# Patient Record
Sex: MALE | Race: WHITE | HISPANIC OR LATINO | Employment: FULL TIME | URBAN - METROPOLITAN AREA
[De-identification: names, ages, dates, MRNs, and addresses within clinical notes are randomized per-mention and may not be internally consistent; named-entity substitution may affect disease eponyms.]

---

## 2020-03-06 ENCOUNTER — HOSPITAL ENCOUNTER (INPATIENT)
Facility: MEDICAL CENTER | Age: 55
LOS: 3 days | DRG: 291 | End: 2020-03-09
Attending: EMERGENCY MEDICINE | Admitting: INTERNAL MEDICINE

## 2020-03-06 ENCOUNTER — APPOINTMENT (OUTPATIENT)
Dept: RADIOLOGY | Facility: MEDICAL CENTER | Age: 55
DRG: 291 | End: 2020-03-06
Attending: EMERGENCY MEDICINE

## 2020-03-06 DIAGNOSIS — J96.01 ACUTE RESPIRATORY FAILURE WITH HYPOXIA (HCC): ICD-10-CM

## 2020-03-06 DIAGNOSIS — I16.1 HYPERTENSIVE EMERGENCY: ICD-10-CM

## 2020-03-06 DIAGNOSIS — J81.0 ACUTE PULMONARY EDEMA (HCC): ICD-10-CM

## 2020-03-06 LAB
ALBUMIN SERPL BCP-MCNC: 4.6 G/DL (ref 3.2–4.9)
ALBUMIN/GLOB SERPL: 1.4 G/DL
ALP SERPL-CCNC: 77 U/L (ref 30–99)
ALT SERPL-CCNC: 48 U/L (ref 2–50)
ANION GAP SERPL CALC-SCNC: 12 MMOL/L (ref 7–16)
AST SERPL-CCNC: 65 U/L (ref 12–45)
BASOPHILS # BLD AUTO: 1.2 % (ref 0–1.8)
BASOPHILS # BLD: 0.15 K/UL (ref 0–0.12)
BILIRUB SERPL-MCNC: 0.6 MG/DL (ref 0.1–1.5)
BUN SERPL-MCNC: 14 MG/DL (ref 8–22)
CALCIUM SERPL-MCNC: 8.9 MG/DL (ref 8.4–10.2)
CHLORIDE SERPL-SCNC: 97 MMOL/L (ref 96–112)
CO2 SERPL-SCNC: 24 MMOL/L (ref 20–33)
CREAT SERPL-MCNC: 0.85 MG/DL (ref 0.5–1.4)
EOSINOPHIL # BLD AUTO: 0.25 K/UL (ref 0–0.51)
EOSINOPHIL NFR BLD: 2 % (ref 0–6.9)
ERYTHROCYTE [DISTWIDTH] IN BLOOD BY AUTOMATED COUNT: 40.1 FL (ref 35.9–50)
FLUAV RNA SPEC QL NAA+PROBE: NEGATIVE
FLUBV RNA SPEC QL NAA+PROBE: NEGATIVE
GLOBULIN SER CALC-MCNC: 3.4 G/DL (ref 1.9–3.5)
GLUCOSE SERPL-MCNC: 174 MG/DL (ref 65–99)
HCT VFR BLD AUTO: 53.5 % (ref 42–52)
HGB BLD-MCNC: 18.7 G/DL (ref 14–18)
IMM GRANULOCYTES # BLD AUTO: 0.05 K/UL (ref 0–0.11)
IMM GRANULOCYTES NFR BLD AUTO: 0.4 % (ref 0–0.9)
LACTATE BLD-SCNC: 2 MMOL/L (ref 0.5–2)
LYMPHOCYTES # BLD AUTO: 2.92 K/UL (ref 1–4.8)
LYMPHOCYTES NFR BLD: 23.9 % (ref 22–41)
MCH RBC QN AUTO: 31.6 PG (ref 27–33)
MCHC RBC AUTO-ENTMCNC: 35 G/DL (ref 33.7–35.3)
MCV RBC AUTO: 90.5 FL (ref 81.4–97.8)
MONOCYTES # BLD AUTO: 0.56 K/UL (ref 0–0.85)
MONOCYTES NFR BLD AUTO: 4.6 % (ref 0–13.4)
NEUTROPHILS # BLD AUTO: 8.28 K/UL (ref 1.82–7.42)
NEUTROPHILS NFR BLD: 67.9 % (ref 44–72)
NRBC # BLD AUTO: 0 K/UL
NRBC BLD-RTO: 0 /100 WBC
NT-PROBNP SERPL IA-MCNC: 1718 PG/ML (ref 0–125)
PLATELET # BLD AUTO: 222 K/UL (ref 164–446)
PMV BLD AUTO: 9.7 FL (ref 9–12.9)
POTASSIUM SERPL-SCNC: 3.3 MMOL/L (ref 3.6–5.5)
PROT SERPL-MCNC: 8 G/DL (ref 6–8.2)
RBC # BLD AUTO: 5.91 M/UL (ref 4.7–6.1)
S PYO AG THROAT QL: NORMAL
SIGNIFICANT IND 70042: NORMAL
SITE SITE: NORMAL
SODIUM SERPL-SCNC: 133 MMOL/L (ref 135–145)
SOURCE SOURCE: NORMAL
TROPONIN T SERPL-MCNC: 12 NG/L (ref 6–19)
WBC # BLD AUTO: 12.2 K/UL (ref 4.8–10.8)

## 2020-03-06 PROCEDURE — 5A09357 ASSISTANCE WITH RESPIRATORY VENTILATION, LESS THAN 24 CONSECUTIVE HOURS, CONTINUOUS POSITIVE AIRWAY PRESSURE: ICD-10-PCS | Performed by: EMERGENCY MEDICINE

## 2020-03-06 PROCEDURE — 87880 STREP A ASSAY W/OPTIC: CPT

## 2020-03-06 PROCEDURE — 87486 CHLMYD PNEUM DNA AMP PROBE: CPT

## 2020-03-06 PROCEDURE — 80053 COMPREHEN METABOLIC PANEL: CPT

## 2020-03-06 PROCEDURE — 84484 ASSAY OF TROPONIN QUANT: CPT

## 2020-03-06 PROCEDURE — 93005 ELECTROCARDIOGRAM TRACING: CPT | Performed by: EMERGENCY MEDICINE

## 2020-03-06 PROCEDURE — 87581 M.PNEUMON DNA AMP PROBE: CPT

## 2020-03-06 PROCEDURE — 700111 HCHG RX REV CODE 636 W/ 250 OVERRIDE (IP): Performed by: EMERGENCY MEDICINE

## 2020-03-06 PROCEDURE — 96375 TX/PRO/DX INJ NEW DRUG ADDON: CPT

## 2020-03-06 PROCEDURE — 83880 ASSAY OF NATRIURETIC PEPTIDE: CPT

## 2020-03-06 PROCEDURE — 87651 STREP A DNA AMP PROBE: CPT

## 2020-03-06 PROCEDURE — 96365 THER/PROPH/DIAG IV INF INIT: CPT

## 2020-03-06 PROCEDURE — 71045 X-RAY EXAM CHEST 1 VIEW: CPT

## 2020-03-06 PROCEDURE — 700111 HCHG RX REV CODE 636 W/ 250 OVERRIDE (IP)

## 2020-03-06 PROCEDURE — 87633 RESP VIRUS 12-25 TARGETS: CPT

## 2020-03-06 PROCEDURE — 96366 THER/PROPH/DIAG IV INF ADDON: CPT

## 2020-03-06 PROCEDURE — 87502 INFLUENZA DNA AMP PROBE: CPT

## 2020-03-06 PROCEDURE — 770022 HCHG ROOM/CARE - ICU (200)

## 2020-03-06 PROCEDURE — 94660 CPAP INITIATION&MGMT: CPT

## 2020-03-06 PROCEDURE — 85025 COMPLETE CBC W/AUTO DIFF WBC: CPT

## 2020-03-06 PROCEDURE — 99291 CRITICAL CARE FIRST HOUR: CPT | Performed by: INTERNAL MEDICINE

## 2020-03-06 PROCEDURE — 83605 ASSAY OF LACTIC ACID: CPT

## 2020-03-06 RX ORDER — NITROGLYCERIN 20 MG/100ML
200 INJECTION INTRAVENOUS ONCE
Status: COMPLETED | OUTPATIENT
Start: 2020-03-06 | End: 2020-03-06

## 2020-03-06 RX ORDER — FUROSEMIDE 10 MG/ML
80 INJECTION INTRAMUSCULAR; INTRAVENOUS ONCE
Status: COMPLETED | OUTPATIENT
Start: 2020-03-06 | End: 2020-03-06

## 2020-03-06 RX ORDER — IPRATROPIUM BROMIDE AND ALBUTEROL SULFATE 2.5; .5 MG/3ML; MG/3ML
6 SOLUTION RESPIRATORY (INHALATION) ONCE
Status: ACTIVE | OUTPATIENT
Start: 2020-03-06 | End: 2020-03-07

## 2020-03-06 RX ORDER — LORAZEPAM 2 MG/ML
1 INJECTION INTRAMUSCULAR ONCE
Status: COMPLETED | OUTPATIENT
Start: 2020-03-06 | End: 2020-03-06

## 2020-03-06 RX ORDER — NITROGLYCERIN 20 MG/100ML
0-200 INJECTION INTRAVENOUS ONCE
Status: COMPLETED | OUTPATIENT
Start: 2020-03-06 | End: 2020-03-07

## 2020-03-06 RX ORDER — NITROGLYCERIN 20 MG/100ML
INJECTION INTRAVENOUS
Status: COMPLETED
Start: 2020-03-06 | End: 2020-03-06

## 2020-03-06 RX ADMIN — NITROGLYCERIN 100 MCG/MIN: 20 INJECTION INTRAVENOUS at 22:53

## 2020-03-06 RX ADMIN — FUROSEMIDE 80 MG: 10 INJECTION, SOLUTION INTRAVENOUS at 23:13

## 2020-03-06 RX ADMIN — NITROGLYCERIN 400 MCG/MIN: 20 INJECTION INTRAVENOUS at 23:08

## 2020-03-06 RX ADMIN — NITROGLYCERIN 400 MCG/MIN: 20 INJECTION INTRAVENOUS at 23:12

## 2020-03-06 RX ADMIN — LORAZEPAM 1 MG: 2 INJECTION INTRAMUSCULAR; INTRAVENOUS at 23:27

## 2020-03-06 ASSESSMENT — ENCOUNTER SYMPTOMS
COUGH: 0
CHILLS: 0
SHORTNESS OF BREATH: 0
FOCAL WEAKNESS: 0
BACK PAIN: 0
NECK PAIN: 0
ABDOMINAL PAIN: 0
VOMITING: 0
FEVER: 0
EYE REDNESS: 0
BLURRED VISION: 0
NERVOUS/ANXIOUS: 1
HEADACHES: 0
SEIZURES: 0
SORE THROAT: 0

## 2020-03-06 ASSESSMENT — PULMONARY FUNCTION TESTS: EPAP_CMH2O: 5

## 2020-03-07 ENCOUNTER — APPOINTMENT (OUTPATIENT)
Dept: CARDIOLOGY | Facility: MEDICAL CENTER | Age: 55
DRG: 291 | End: 2020-03-07
Attending: INTERNAL MEDICINE

## 2020-03-07 ENCOUNTER — APPOINTMENT (OUTPATIENT)
Dept: RADIOLOGY | Facility: MEDICAL CENTER | Age: 55
DRG: 291 | End: 2020-03-07
Attending: INTERNAL MEDICINE

## 2020-03-07 PROBLEM — J81.0 FLASH PULMONARY EDEMA (HCC): Status: ACTIVE | Noted: 2020-03-07

## 2020-03-07 PROBLEM — J96.01 ACUTE RESPIRATORY FAILURE WITH HYPOXIA (HCC): Status: ACTIVE | Noted: 2020-03-07

## 2020-03-07 PROBLEM — I16.1 HYPERTENSIVE EMERGENCY: Status: ACTIVE | Noted: 2020-03-07

## 2020-03-07 PROBLEM — D75.1 POLYCYTHEMIA: Status: ACTIVE | Noted: 2020-03-07

## 2020-03-07 PROBLEM — E87.6 HYPOKALEMIA: Status: ACTIVE | Noted: 2020-03-07

## 2020-03-07 PROBLEM — R73.9 HYPERGLYCEMIA: Status: ACTIVE | Noted: 2020-03-07

## 2020-03-07 PROBLEM — D72.829 LEUKOCYTOSIS: Status: ACTIVE | Noted: 2020-03-07

## 2020-03-07 LAB
ANION GAP SERPL CALC-SCNC: 11 MMOL/L (ref 7–16)
BASOPHILS # BLD AUTO: 0.4 % (ref 0–1.8)
BASOPHILS # BLD: 0.07 K/UL (ref 0–0.12)
BUN SERPL-MCNC: 14 MG/DL (ref 8–22)
C PNEUM DNA SPEC QL NAA+PROBE: NOT DETECTED
CALCIUM SERPL-MCNC: 8.5 MG/DL (ref 8.4–10.2)
CHLORIDE SERPL-SCNC: 97 MMOL/L (ref 96–112)
CO2 SERPL-SCNC: 26 MMOL/L (ref 20–33)
CREAT SERPL-MCNC: 0.9 MG/DL (ref 0.5–1.4)
EKG IMPRESSION: NORMAL
EOSINOPHIL # BLD AUTO: 0.01 K/UL (ref 0–0.51)
EOSINOPHIL NFR BLD: 0.1 % (ref 0–6.9)
ERYTHROCYTE [DISTWIDTH] IN BLOOD BY AUTOMATED COUNT: 39.9 FL (ref 35.9–50)
EST. AVERAGE GLUCOSE BLD GHB EST-MCNC: 111 MG/DL
FLUAV H1 2009 PAND RNA SPEC QL NAA+PROBE: NOT DETECTED
FLUAV H1 RNA SPEC QL NAA+PROBE: NOT DETECTED
FLUAV H3 RNA SPEC QL NAA+PROBE: NOT DETECTED
FLUAV RNA SPEC QL NAA+PROBE: NOT DETECTED
FLUBV RNA SPEC QL NAA+PROBE: NOT DETECTED
GLUCOSE BLD-MCNC: 120 MG/DL (ref 65–99)
GLUCOSE BLD-MCNC: 139 MG/DL (ref 65–99)
GLUCOSE BLD-MCNC: 84 MG/DL (ref 65–99)
GLUCOSE BLD-MCNC: 97 MG/DL (ref 65–99)
GLUCOSE BLD-MCNC: 99 MG/DL (ref 65–99)
GLUCOSE SERPL-MCNC: 140 MG/DL (ref 65–99)
HADV DNA SPEC QL NAA+PROBE: NOT DETECTED
HBA1C MFR BLD: 5.5 % (ref 0–5.6)
HCOV RNA SPEC QL NAA+PROBE: NOT DETECTED
HCT VFR BLD AUTO: 47.3 % (ref 42–52)
HGB BLD-MCNC: 16.6 G/DL (ref 14–18)
HMPV RNA SPEC QL NAA+PROBE: NOT DETECTED
HPIV1 RNA SPEC QL NAA+PROBE: NOT DETECTED
HPIV2 RNA SPEC QL NAA+PROBE: NOT DETECTED
HPIV3 RNA SPEC QL NAA+PROBE: NOT DETECTED
HPIV4 RNA SPEC QL NAA+PROBE: NOT DETECTED
IMM GRANULOCYTES # BLD AUTO: 0.08 K/UL (ref 0–0.11)
IMM GRANULOCYTES NFR BLD AUTO: 0.5 % (ref 0–0.9)
LV EJECT FRACT  99904: 40
LV EJECT FRACT MOD 2C 99903: 54.25
LV EJECT FRACT MOD 4C 99902: 48.93
LV EJECT FRACT MOD BP 99901: 49.44
LYMPHOCYTES # BLD AUTO: 0.87 K/UL (ref 1–4.8)
LYMPHOCYTES NFR BLD: 5.3 % (ref 22–41)
M PNEUMO DNA SPEC QL NAA+PROBE: NOT DETECTED
MCH RBC QN AUTO: 31.3 PG (ref 27–33)
MCHC RBC AUTO-ENTMCNC: 35.1 G/DL (ref 33.7–35.3)
MCV RBC AUTO: 89.2 FL (ref 81.4–97.8)
MONOCYTES # BLD AUTO: 0.89 K/UL (ref 0–0.85)
MONOCYTES NFR BLD AUTO: 5.5 % (ref 0–13.4)
NEUTROPHILS # BLD AUTO: 14.38 K/UL (ref 1.82–7.42)
NEUTROPHILS NFR BLD: 88.2 % (ref 44–72)
NRBC # BLD AUTO: 0 K/UL
NRBC BLD-RTO: 0 /100 WBC
PLATELET # BLD AUTO: 203 K/UL (ref 164–446)
PMV BLD AUTO: 9.9 FL (ref 9–12.9)
POTASSIUM SERPL-SCNC: 3.7 MMOL/L (ref 3.6–5.5)
RBC # BLD AUTO: 5.3 M/UL (ref 4.7–6.1)
RSV A RNA SPEC QL NAA+PROBE: NOT DETECTED
RSV B RNA SPEC QL NAA+PROBE: NOT DETECTED
RV+EV RNA SPEC QL NAA+PROBE: NOT DETECTED
S PYO DNA SPEC NAA+PROBE: NOT DETECTED
SODIUM SERPL-SCNC: 134 MMOL/L (ref 135–145)
TROPONIN T SERPL-MCNC: 53 NG/L (ref 6–19)
TROPONIN T SERPL-MCNC: 62 NG/L (ref 6–19)
WBC # BLD AUTO: 16.3 K/UL (ref 4.8–10.8)

## 2020-03-07 PROCEDURE — 83036 HEMOGLOBIN GLYCOSYLATED A1C: CPT

## 2020-03-07 PROCEDURE — 99291 CRITICAL CARE FIRST HOUR: CPT

## 2020-03-07 PROCEDURE — 84484 ASSAY OF TROPONIN QUANT: CPT

## 2020-03-07 PROCEDURE — 93005 ELECTROCARDIOGRAM TRACING: CPT | Performed by: EMERGENCY MEDICINE

## 2020-03-07 PROCEDURE — 93306 TTE W/DOPPLER COMPLETE: CPT | Mod: 26 | Performed by: INTERNAL MEDICINE

## 2020-03-07 PROCEDURE — 700111 HCHG RX REV CODE 636 W/ 250 OVERRIDE (IP): Performed by: INTERNAL MEDICINE

## 2020-03-07 PROCEDURE — 96366 THER/PROPH/DIAG IV INF ADDON: CPT

## 2020-03-07 PROCEDURE — 94760 N-INVAS EAR/PLS OXIMETRY 1: CPT

## 2020-03-07 PROCEDURE — 99233 SBSQ HOSP IP/OBS HIGH 50: CPT | Performed by: INTERNAL MEDICINE

## 2020-03-07 PROCEDURE — 87040 BLOOD CULTURE FOR BACTERIA: CPT

## 2020-03-07 PROCEDURE — 93306 TTE W/DOPPLER COMPLETE: CPT

## 2020-03-07 PROCEDURE — A9270 NON-COVERED ITEM OR SERVICE: HCPCS | Performed by: INTERNAL MEDICINE

## 2020-03-07 PROCEDURE — 82962 GLUCOSE BLOOD TEST: CPT | Mod: 91

## 2020-03-07 PROCEDURE — 71045 X-RAY EXAM CHEST 1 VIEW: CPT

## 2020-03-07 PROCEDURE — 700102 HCHG RX REV CODE 250 W/ 637 OVERRIDE(OP): Performed by: INTERNAL MEDICINE

## 2020-03-07 PROCEDURE — 85025 COMPLETE CBC W/AUTO DIFF WBC: CPT

## 2020-03-07 PROCEDURE — 700117 HCHG RX CONTRAST REV CODE 255: Performed by: INTERNAL MEDICINE

## 2020-03-07 PROCEDURE — 94660 CPAP INITIATION&MGMT: CPT

## 2020-03-07 PROCEDURE — 770020 HCHG ROOM/CARE - TELE (206)

## 2020-03-07 PROCEDURE — 80048 BASIC METABOLIC PNL TOTAL CA: CPT

## 2020-03-07 PROCEDURE — 96375 TX/PRO/DX INJ NEW DRUG ADDON: CPT

## 2020-03-07 RX ORDER — POTASSIUM CHLORIDE 20 MEQ/1
40 TABLET, EXTENDED RELEASE ORAL 2 TIMES DAILY
Status: DISCONTINUED | OUTPATIENT
Start: 2020-03-07 | End: 2020-03-09 | Stop reason: HOSPADM

## 2020-03-07 RX ORDER — LABETALOL HYDROCHLORIDE 5 MG/ML
10 INJECTION, SOLUTION INTRAVENOUS EVERY 4 HOURS PRN
Status: DISCONTINUED | OUTPATIENT
Start: 2020-03-07 | End: 2020-03-09 | Stop reason: HOSPADM

## 2020-03-07 RX ORDER — PROCHLORPERAZINE EDISYLATE 5 MG/ML
5-10 INJECTION INTRAMUSCULAR; INTRAVENOUS EVERY 4 HOURS PRN
Status: DISCONTINUED | OUTPATIENT
Start: 2020-03-07 | End: 2020-03-09 | Stop reason: HOSPADM

## 2020-03-07 RX ORDER — POLYETHYLENE GLYCOL 3350 17 G/17G
1 POWDER, FOR SOLUTION ORAL
Status: DISCONTINUED | OUTPATIENT
Start: 2020-03-07 | End: 2020-03-09 | Stop reason: HOSPADM

## 2020-03-07 RX ORDER — ENALAPRILAT 1.25 MG/ML
1.25 INJECTION INTRAVENOUS EVERY 6 HOURS PRN
Status: DISCONTINUED | OUTPATIENT
Start: 2020-03-07 | End: 2020-03-09 | Stop reason: HOSPADM

## 2020-03-07 RX ORDER — ACETAMINOPHEN 325 MG/1
650 TABLET ORAL EVERY 6 HOURS PRN
Status: DISCONTINUED | OUTPATIENT
Start: 2020-03-07 | End: 2020-03-09 | Stop reason: HOSPADM

## 2020-03-07 RX ORDER — FUROSEMIDE 10 MG/ML
40 INJECTION INTRAMUSCULAR; INTRAVENOUS
Status: DISCONTINUED | OUTPATIENT
Start: 2020-03-07 | End: 2020-03-07

## 2020-03-07 RX ORDER — PROMETHAZINE HYDROCHLORIDE 25 MG/1
12.5-25 TABLET ORAL EVERY 4 HOURS PRN
Status: DISCONTINUED | OUTPATIENT
Start: 2020-03-07 | End: 2020-03-09 | Stop reason: HOSPADM

## 2020-03-07 RX ORDER — PROMETHAZINE HYDROCHLORIDE 25 MG/1
12.5-25 SUPPOSITORY RECTAL EVERY 4 HOURS PRN
Status: DISCONTINUED | OUTPATIENT
Start: 2020-03-07 | End: 2020-03-09 | Stop reason: HOSPADM

## 2020-03-07 RX ORDER — LORAZEPAM 2 MG/ML
1 INJECTION INTRAMUSCULAR
Status: DISCONTINUED | OUTPATIENT
Start: 2020-03-07 | End: 2020-03-09 | Stop reason: HOSPADM

## 2020-03-07 RX ORDER — BISACODYL 10 MG
10 SUPPOSITORY, RECTAL RECTAL
Status: DISCONTINUED | OUTPATIENT
Start: 2020-03-07 | End: 2020-03-09 | Stop reason: HOSPADM

## 2020-03-07 RX ORDER — LISINOPRIL 5 MG/1
10 TABLET ORAL
Status: DISCONTINUED | OUTPATIENT
Start: 2020-03-07 | End: 2020-03-08

## 2020-03-07 RX ORDER — AMOXICILLIN 250 MG
2 CAPSULE ORAL 2 TIMES DAILY
Status: DISCONTINUED | OUTPATIENT
Start: 2020-03-07 | End: 2020-03-09 | Stop reason: HOSPADM

## 2020-03-07 RX ORDER — ONDANSETRON 4 MG/1
4 TABLET, ORALLY DISINTEGRATING ORAL EVERY 4 HOURS PRN
Status: DISCONTINUED | OUTPATIENT
Start: 2020-03-07 | End: 2020-03-09 | Stop reason: HOSPADM

## 2020-03-07 RX ORDER — ONDANSETRON 2 MG/ML
4 INJECTION INTRAMUSCULAR; INTRAVENOUS EVERY 4 HOURS PRN
Status: DISCONTINUED | OUTPATIENT
Start: 2020-03-07 | End: 2020-03-09 | Stop reason: HOSPADM

## 2020-03-07 RX ORDER — FUROSEMIDE 10 MG/ML
20 INJECTION INTRAMUSCULAR; INTRAVENOUS
Status: DISCONTINUED | OUTPATIENT
Start: 2020-03-08 | End: 2020-03-08

## 2020-03-07 RX ORDER — NITROGLYCERIN 20 MG/100ML
0-200 INJECTION INTRAVENOUS CONTINUOUS
Status: DISCONTINUED | OUTPATIENT
Start: 2020-03-07 | End: 2020-03-07

## 2020-03-07 RX ADMIN — ONDANSETRON 4 MG: 2 INJECTION INTRAMUSCULAR; INTRAVENOUS at 03:20

## 2020-03-07 RX ADMIN — POTASSIUM CHLORIDE 40 MEQ: 1500 TABLET, EXTENDED RELEASE ORAL at 17:13

## 2020-03-07 RX ADMIN — ENALAPRILAT 1.25 MG: 2.5 INJECTION INTRAVENOUS at 23:26

## 2020-03-07 RX ADMIN — POTASSIUM CHLORIDE 40 MEQ: 1500 TABLET, EXTENDED RELEASE ORAL at 05:10

## 2020-03-07 RX ADMIN — FUROSEMIDE 40 MG: 10 INJECTION, SOLUTION INTRAMUSCULAR; INTRAVENOUS at 06:02

## 2020-03-07 RX ADMIN — LISINOPRIL 10 MG: 5 TABLET ORAL at 05:10

## 2020-03-07 RX ADMIN — HUMAN ALBUMIN MICROSPHERES AND PERFLUTREN 3 ML: 10; .22 INJECTION, SOLUTION INTRAVENOUS at 14:57

## 2020-03-07 RX ADMIN — ENOXAPARIN SODIUM 40 MG: 40 INJECTION SUBCUTANEOUS at 05:15

## 2020-03-07 ASSESSMENT — ENCOUNTER SYMPTOMS
NEUROLOGICAL NEGATIVE: 1
GASTROINTESTINAL NEGATIVE: 1
EYES NEGATIVE: 1
PSYCHIATRIC NEGATIVE: 1
RESPIRATORY NEGATIVE: 1
CARDIOVASCULAR NEGATIVE: 1
MUSCULOSKELETAL NEGATIVE: 1

## 2020-03-07 ASSESSMENT — COGNITIVE AND FUNCTIONAL STATUS - GENERAL
SUGGESTED CMS G CODE MODIFIER MOBILITY: CH
MOBILITY SCORE: 24
MOBILITY SCORE: 24
SUGGESTED CMS G CODE MODIFIER DAILY ACTIVITY: CH
SUGGESTED CMS G CODE MODIFIER MOBILITY: CH
SUGGESTED CMS G CODE MODIFIER DAILY ACTIVITY: CH
DAILY ACTIVITIY SCORE: 24
DAILY ACTIVITIY SCORE: 24

## 2020-03-07 ASSESSMENT — LIFESTYLE VARIABLES
HAVE PEOPLE ANNOYED YOU BY CRITICIZING YOUR DRINKING: NO
AVERAGE NUMBER OF DAYS PER WEEK YOU HAVE A DRINK CONTAINING ALCOHOL: 0
EVER_SMOKED: YES
EVER HAD A DRINK FIRST THING IN THE MORNING TO STEADY YOUR NERVES TO GET RID OF A HANGOVER: NO
ON A TYPICAL DAY WHEN YOU DRINK ALCOHOL HOW MANY DRINKS DO YOU HAVE: 0
HOW MANY TIMES IN THE PAST YEAR HAVE YOU HAD 5 OR MORE DRINKS IN A DAY: 0
EVER FELT BAD OR GUILTY ABOUT YOUR DRINKING: NO
TOTAL SCORE: 0
TOTAL SCORE: 0
ALCOHOL_USE: NO
CONSUMPTION TOTAL: NEGATIVE
TOTAL SCORE: 0
EVER_SMOKED: NEVER
HAVE YOU EVER FELT YOU SHOULD CUT DOWN ON YOUR DRINKING: NO

## 2020-03-07 ASSESSMENT — PATIENT HEALTH QUESTIONNAIRE - PHQ9
1. LITTLE INTEREST OR PLEASURE IN DOING THINGS: NOT AT ALL
SUM OF ALL RESPONSES TO PHQ9 QUESTIONS 1 AND 2: 0
2. FEELING DOWN, DEPRESSED, IRRITABLE, OR HOPELESS: NOT AT ALL

## 2020-03-07 ASSESSMENT — COPD QUESTIONNAIRES
DURING THE PAST 4 WEEKS HOW MUCH DID YOU FEEL SHORT OF BREATH: NONE/LITTLE OF THE TIME
HAVE YOU SMOKED AT LEAST 100 CIGARETTES IN YOUR ENTIRE LIFE: NO/DON'T KNOW
DO YOU EVER COUGH UP ANY MUCUS OR PHLEGM?: NO/ONLY WITH OCCASIONAL COLDS OR INFECTIONS
COPD SCREENING SCORE: 1

## 2020-03-07 ASSESSMENT — FIBROSIS 4 INDEX
FIB4 SCORE: 2.5
FIB4 SCORE: 2.5

## 2020-03-07 ASSESSMENT — PULMONARY FUNCTION TESTS
EPAP_CMH2O: 8
EPAP_CMH2O: 5

## 2020-03-07 NOTE — PROGRESS NOTES
Critical Care Progress Note    Date of admission  3/6/2020    Chief Complaint  54 y.o. male admitted 3/6/2020 with SOB and systolic BP in the 250s      Hospital Course    54 y.o. male admitted 3/6/2020 with SOB and systolic BP in the 250s  He has known hx of HTN but has not been taking his medications  Found to be in flash pulmonary edema placed on bipap, given lasix and also placed on a nitroglycerin drip and started on oral antihypertensives  Done well  Down to 4 l NC  1400 cc output  Systolic in 140s  Creatinine wnl  EKG LVH and repolarization   ECHO pending  Cardiology called overnight  Troponins trending down        Interval Problem Update  Reviewed last 24 hour events:  As above    Review of Systems  Review of Systems   Constitutional: Positive for malaise/fatigue.   HENT: Negative.    Eyes: Negative.    Respiratory: Negative.    Cardiovascular: Negative.    Gastrointestinal: Negative.    Genitourinary: Negative.    Musculoskeletal: Negative.    Skin: Negative.    Neurological: Negative.    Endo/Heme/Allergies: Negative.    Psychiatric/Behavioral: Negative.         Vital Signs for last 24 hours   Temp:  [36.5 °C (97.7 °F)-36.9 °C (98.4 °F)] 36.9 °C (98.4 °F)  Pulse:  [] 70  Resp:  [9-33] 21  BP: (118-248)/() 158/94  SpO2:  [81 %-100 %] 97 %         Physical Exam   Physical Exam  Constitutional:       Appearance: Normal appearance.   HENT:      Head: Normocephalic and atraumatic.      Nose: Nose normal.      Mouth/Throat:      Mouth: Mucous membranes are moist.   Eyes:      Pupils: Pupils are equal, round, and reactive to light.   Neck:      Musculoskeletal: Normal range of motion and neck supple.   Cardiovascular:      Rate and Rhythm: Normal rate and regular rhythm.   Pulmonary:      Effort: Pulmonary effort is normal.      Breath sounds: Normal breath sounds.   Abdominal:      General: Abdomen is flat.      Palpations: Abdomen is soft.   Musculoskeletal: Normal range of motion.   Skin:      General: Skin is warm.   Neurological:      General: No focal deficit present.      Mental Status: He is alert and oriented to person, place, and time.   Psychiatric:         Mood and Affect: Mood normal.         Behavior: Behavior normal.         Thought Content: Thought content normal.         Judgment: Judgment normal.         Medications  Current Facility-Administered Medications   Medication Dose Route Frequency Provider Last Rate Last Dose   • senna-docusate (PERICOLACE or SENOKOT S) 8.6-50 MG per tablet 2 Tab  2 Tab Oral BID Juma Page D.O.        And   • polyethylene glycol/lytes (MIRALAX) PACKET 1 Packet  1 Packet Oral QDAY PRN Juma Page D.O.        And   • magnesium hydroxide (MILK OF MAGNESIA) suspension 30 mL  30 mL Oral QDAY PRN Juma Page D.O.        And   • bisacodyl (DULCOLAX) suppository 10 mg  10 mg Rectal QDAY PRN Juma Page D.O.       • Respiratory Therapy Consult   Nebulization Continuous RT Juma Page D.O.       • enoxaparin (LOVENOX) inj 40 mg  40 mg Subcutaneous DAILY LYNETTE AlanisO.   40 mg at 03/07/20 0515   • acetaminophen (TYLENOL) tablet 650 mg  650 mg Oral Q6HRS PRN LYNETTE AlanisO.       • enalaprilat (VASOTEC) injection 1.25 mg  1.25 mg Intravenous Q6HRS PRN LYNETTE AlanisO.       • labetalol (NORMODYNE/TRANDATE) injection 10 mg  10 mg Intravenous Q4HRS PRN LYNETTE AlanisO.       • ondansetron (ZOFRAN) syringe/vial injection 4 mg  4 mg Intravenous Q4HRS PRN ROD Alanis.O.   4 mg at 03/07/20 0320   • ondansetron (ZOFRAN ODT) dispertab 4 mg  4 mg Oral Q4HRS PRN LYNETTE AlanisO.       • promethazine (PHENERGAN) tablet 12.5-25 mg  12.5-25 mg Oral Q4HRS PRN LYNETTE AlanisO.       • promethazine (PHENERGAN) suppository 12.5-25 mg  12.5-25 mg Rectal Q4HRS PRN LYNETTE AlanisO.       • prochlorperazine (COMPAZINE) injection 5-10 mg  5-10 mg Intravenous Q4HRS PRN Juma Page D.O.       • potassium chloride SA (Kdur)  tablet 40 mEq  40 mEq Oral BID Juma Page D.O.   40 mEq at 03/07/20 0510   • lisinopril (PRINIVIL) tablet 10 mg  10 mg Oral Q DAY Juma Page D.O.   10 mg at 03/07/20 0510   • LORazepam (ATIVAN) injection 1 mg  1 mg Intravenous Q3HRS PRN Juma Page D.O.       • insulin regular (HUMULIN R) injection 1-6 Units  1-6 Units Subcutaneous 4X/DAY ACHS Juma Page D.O.   Stopped at 03/07/20 0700    And   • glucose 4 g chewable tablet 16 g  16 g Oral Q15 MIN PRN Juma Page D.O.        And   • DEXTROSE 10% BOLUS 250 mL  250 mL Intravenous Q15 MIN PRN Juma Page D.O.       • [START ON 3/8/2020] furosemide (LASIX) injection 20 mg  20 mg Intravenous Q DAY Airam Roberson M.D.       • ipratropium-albuterol (DUONEB) nebulizer solution  6 mL Nebulization Once Yanique Villa M.D.   Stopped at 03/06/20 2300       Fluids    Intake/Output Summary (Last 24 hours) at 3/7/2020 1009  Last data filed at 3/7/2020 0800  Gross per 24 hour   Intake 584.8 ml   Output 2300 ml   Net -1715.2 ml       Laboratory          Recent Labs     03/06/20 2236 03/07/20  0245   SODIUM 133* 134*   POTASSIUM 3.3* 3.7   CHLORIDE 97 97   CO2 24 26   BUN 14 14   CREATININE 0.85 0.90   CALCIUM 8.9 8.5     Recent Labs     03/06/20 2236 03/07/20  0245   ALTSGPT 48  --    ASTSGOT 65*  --    ALKPHOSPHAT 77  --    TBILIRUBIN 0.6  --    GLUCOSE 174* 140*     Recent Labs     03/06/20 2236 03/07/20  0245   WBC 12.2* 16.3*   NEUTSPOLYS 67.90 88.20*   LYMPHOCYTES 23.90 5.30*   MONOCYTES 4.60 5.50   EOSINOPHILS 2.00 0.10   BASOPHILS 1.20 0.40   ASTSGOT 65*  --    ALTSGPT 48  --    ALKPHOSPHAT 77  --    TBILIRUBIN 0.6  --      Recent Labs     03/06/20 2236 03/07/20  0245   RBC 5.91 5.30   HEMOGLOBIN 18.7* 16.6   HEMATOCRIT 53.5* 47.3   PLATELETCT 222 203       Imaging  Repeat CXR completely resolved pulmonary edema    Assessment/Plan  * Acute respiratory failure with hypoxia (HCC)- (present on admission)  Assessment & Plan  On nasal  cannula and likely can titrate off    Flash pulmonary edema (HCC)- (present on admission)  Assessment & Plan  Resolved  Decreased lasix to 20 mg qday  ECHO this am  Troponin trending down  Lipid profile    Hypertensive emergency- (present on admission)  Assessment & Plan  Improved  Systolic in the 140s  On lisinopril 10 mg  Stopped nitroglycerin    Hyperglycemia- (present on admission)  Assessment & Plan  On iSS  Will monitor  Check HBA1 c    Leukocytosis- (present on admission)  Assessment & Plan  Assume stress induced  Monitor  No signs of infection     Stable to transfer to telemetry    VTE:  Lovenox  Ulcer: Not Indicated  Lines: PIV    I have performed a physical exam and reviewed and updated ROS and Plan today (3/7/2020). In review of yesterday's note (3/6/2020), there are no changes except as documented above.

## 2020-03-07 NOTE — FLOWSHEET NOTE
03/07/20 0634   Events/Summary/Plan   Events/Summary/Plan Placed pt on 4L NC   Skin Inspection Respiratory Device Intact   Vital Signs   Pulse 75   Respiration 18   Pulse Oximetry 95 %   $ Pulse Oximetry (Spot Check) Yes   Respiratory Assessment   Level of Consciousness Alert   Breath Sounds   RUL Breath Sounds Clear   RML Breath Sounds Diminished;Crackles   RLL Breath Sounds Diminished;Crackles   REY Breath Sounds Clear   LLL Breath Sounds Diminished;Crackles   Oxygen   O2 (LPM) 4   O2 Delivery Device Nasal Cannula

## 2020-03-07 NOTE — PROGRESS NOTES
Med rec completed   Allergies reviewed  No PO antibiotics in the last 14 days    Pt does not currently take any daily medications

## 2020-03-07 NOTE — H&P
Hospital Medicine History & Physical Note    Date of Service  3/6/2020    Primary Care Physician  None     Consultants  None    Code Status  Full    Chief Complaint  Shortness of breath    History of Presenting Illness  54 y.o. male who presented 3/6/2020 with sudden onset shortness of breath.  Patient at this point in time is a little bit somnolent, on BiPAP, nonverbal.  Because of this, I am unable to get information from him, information obtained from family at bedside.  Patient woke up in his usual state of health and went to work.  Family member received a call this morning due to sudden onset shortness of breath.  This persisted so he presented to the emergency department.  Patient was noted to have profound elevation in his blood pressure upon arrival with systolic blood pressure in the 250s.  He does have a history of hypertension but has not been taking meds for years.  He did not have any chest pain, cough, fever or chills.  Upon arrival, imaging was obtained and showed flash pulmonary edema.  I did discuss the case including labs and imaging with the ER physician.    Review of Systems  Review of Systems   Unable to perform ROS: Acuity of condition       Past Medical History  Hypertension    Surgical History  None    Family History  Diabetes mellitus    Social History   No history of tobacco, alcohol or illicit drug use    Allergies  No Known Allergies    Medications  None       Physical Exam  Pulse:  [125-136] 133  Resp:  [30-33] 30  BP: (227-248)/(141-176) 227/141  SpO2:  [81 %-97 %] 93 %    Physical Exam  Vitals signs and nursing note reviewed.   Constitutional:       General: He is in acute distress (respiratory ).      Appearance: He is well-developed. He is ill-appearing. He is not toxic-appearing or diaphoretic.   HENT:      Head: Normocephalic and atraumatic.      Right Ear: External ear normal.      Left Ear: External ear normal.      Nose: Nose normal. No congestion or rhinorrhea.       Mouth/Throat:      Mouth: Mucous membranes are moist.      Pharynx: No oropharyngeal exudate.   Eyes:      General: No scleral icterus.        Right eye: No discharge.         Left eye: No discharge.      Extraocular Movements: Extraocular movements intact.   Neck:      Musculoskeletal: Neck supple. No edema or erythema.      Trachea: No tracheal deviation.   Cardiovascular:      Rate and Rhythm: Normal rate and regular rhythm.      Heart sounds: No murmur. No friction rub. No gallop.    Pulmonary:      Effort: Tachypnea and respiratory distress present.      Breath sounds: No stridor. Rales present. No wheezing.   Abdominal:      General: Bowel sounds are normal. There is no distension.      Palpations: Abdomen is soft.   Musculoskeletal: Normal range of motion.         General: No tenderness.      Right lower leg: No edema.      Left lower leg: No edema.   Lymphadenopathy:      Cervical: No cervical adenopathy.   Skin:     General: Skin is warm and dry.      Coloration: Skin is not jaundiced.      Findings: No erythema or rash.   Neurological:      Comments: Somnolent, arousable, nonverbal    Psychiatric:         Speech: He is noncommunicative.         Laboratory:  Recent Labs     03/06/20 2236   WBC 12.2*   RBC 5.91   HEMOGLOBIN 18.7*   HEMATOCRIT 53.5*   MCV 90.5   MCH 31.6   MCHC 35.0   RDW 40.1   PLATELETCT 222   MPV 9.7     Recent Labs     03/06/20  2236   SODIUM 133*   POTASSIUM 3.3*   CHLORIDE 97   CO2 24   GLUCOSE 174*   BUN 14   CREATININE 0.85   CALCIUM 8.9     Recent Labs     03/06/20  2236   ALTSGPT 48   ASTSGOT 65*   ALKPHOSPHAT 77   TBILIRUBIN 0.6   GLUCOSE 174*         Recent Labs     03/06/20  2236   NTPROBNP 1718*         Recent Labs     03/06/20  2236   TROPONINT 12       Urinalysis:    No results found     Imaging:  DX-CHEST-PORTABLE (1 VIEW)   Final Result      Moderate to severe perihilar and interstitial airspace opacities which could be seen in the setting of edema and/or infection.       EC-ECHOCARDIOGRAM COMPLETE W/O CONT    (Results Pending)         Assessment/Plan:  I anticipate this patient will require at least two midnights for appropriate medical management, necessitating inpatient admission.    * Acute respiratory failure with hypoxia (HCC)- (present on admission)  Assessment & Plan  -Patient is now requiring BiPAP  -I did personally review his chest x-ray, noted bilateral pulmonary edema consistent with flash pulmonary edema  -Patient does require close monitoring in the ICU, he is at high risk of worsening and may still require intubation     Flash pulmonary edema (HCC)- (present on admission)  Assessment & Plan  -I feel this is likely due to hypertensive emergency  -His systolic blood pressure was in the 250s upon arrival  -start nitroglycerin drip  -Start IV Lasix  -Obtain echocardiogram  -Trend troponin    Hypertensive emergency- (present on admission)  Assessment & Plan  -Profound elevation in his blood pressure  -Start nitroglycerin drip  -Start lisinopril   -He will likely need a beta-blocker however at this point in time it is acute failure and I would like to see an echocardiogram before initiating the beta-blocker  -Start multiple PRN's to keep systolic blood pressure less than 180  -Adjust as needed    Hypokalemia- (present on admission)  Assessment & Plan  -Patient will be placed on oral potassium supplementation  -Repeat BMP in the morning    Hyperglycemia- (present on admission)  Assessment & Plan  -Mild, no history of diabetes however he does not follow-up with PCP  -Obtain hemoglobin A1c  -Start insulin sliding scale    Leukocytosis- (present on admission)  Assessment & Plan  -Likely reactive, no additional sign of infection  -This was a sudden onset associated with profoundly uncontrolled hypertension  -I do not feel there is an infection, no antibiotics required at this time    Polycythemia- (present on admission)  Assessment & Plan  -Likely due to hypoxia  -Repeat CBC  in the morning    Patient is critically ill.   The patient continues to have : Hypertensive emergency  The vital organ system that is effected is the: Cardiac initially  If untreated there is a high chance of deterioration into: Cardiogenic shock, respiratory failure  The critical care that I am providing today is: Nitroglycerin drip  The critical care that has been undertaken is medically complex.   There has been no overlap in critical care time.  Critical care time not including procedures, no overlap: 44 minutes    VTE prophylaxis: Lovenox

## 2020-03-07 NOTE — ASSESSMENT & PLAN NOTE
-Mild, no history of diabetes however he does not follow-up with PCP  -Obtain hemoglobin A1c  -Start insulin sliding scale

## 2020-03-07 NOTE — ED PROVIDER NOTES
ED Provider Note    CHIEF COMPLAINT  Chief Complaint   Patient presents with   • Shortness of Breath     started about an hour ago    • Chest Pressure       HPI  Rashad Perales is a 54 y.o. male with history of hypertension who has not been on medications recently who presents with acute onset of shortness of breath which started an hour ago.  Per the patient's wife, he was at work and called her stating he did not feel well.  His symptoms rapidly escalated and when he came home he was having significant trouble breathing.  Prior to this he was feeling good this morning.  He has not had any recent fevers or cough or respiratory illness.  He does endorse some chest pressure along with the trouble breathing.  No lower extremity swelling.  No recent travel.  The patient is supposed to be on blood pressure medications however is been not been taking them for some time.    REVIEW OF SYSTEMS  See HPI for further details.   Review of Systems   Constitutional: Negative for chills and fever.   HENT: Negative for sore throat.    Eyes: Negative for blurred vision and redness.   Respiratory: Negative for cough and shortness of breath.    Cardiovascular: Positive for chest pain and leg swelling.   Gastrointestinal: Negative for abdominal pain and vomiting.   Genitourinary: Negative for dysuria and urgency.   Musculoskeletal: Negative for back pain and neck pain.   Skin: Negative for rash.   Neurological: Negative for focal weakness, seizures and headaches.   Psychiatric/Behavioral: Negative for suicidal ideas. The patient is nervous/anxious.          PAST MEDICAL HISTORY       SOCIAL HISTORY  Social History     Tobacco Use   • Smoking status: Not on file   Substance and Sexual Activity   • Alcohol use: Not on file   • Drug use: Not on file   • Sexual activity: Not on file       SURGICAL HISTORY  patient denies any surgical history    CURRENT MEDICATIONS  Home Medications    **Home medications have not yet been reviewed for  this encounter**         ALLERGIES  No Known Allergies    PHYSICAL EXAM   VITAL SIGNS: BP (!) 227/141   Pulse (!) 133   Resp (!) 30   Ht 1.829 m (6')   Wt 83.8 kg (184 lb 11.9 oz)   SpO2 93%   BMI 25.06 kg/m²      Physical Exam   Constitutional: He is oriented to person, place, and time. He appears distressed.   Middle-age  male in significant respiratory distress   HENT:   Head: Normocephalic and atraumatic.   Eyes: Pupils are equal, round, and reactive to light. Conjunctivae are normal.   Neck: Normal range of motion. Neck supple.   Cardiovascular: Regular rhythm and normal heart sounds.   Tachycardic   Pulmonary/Chest: He is in respiratory distress. He has rales.   Tachypneic with significant increased work of breathing and audible crackles throughout his lung fields   Abdominal: Soft. He exhibits no distension. There is no abdominal tenderness.   Musculoskeletal: Normal range of motion.         General: No tenderness or edema.   Neurological: He is alert and oriented to person, place, and time.   Moving all extremities spontaneously   Skin: Skin is warm. He is diaphoretic.   Psychiatric:   Anxious appearing         DIAGNOSTIC STUDIES    EKG  Results for orders placed or performed during the hospital encounter of 20   EKG (Now)   Result Value Ref Range    Report       [facility] Emergency Dept.    Test Date:  2020  Pt Name:    STEVE NOVAK         Department: [facility]  MRN:        [id number]                      [facility]  Gender:     Male                         Technician: HRR  :        1965                   Requested By:YANIQUE ROCHA  Order #:    [id number]                    Reading MD: Yanique Rocha MD    Measurements  Intervals                                Axis  Rate:       127                          P:          72  NE:         148                          QRS:        31  QRSD:       106                          T:           122  QT:         308  QTc:        448    Interpretive Statements  SINUS TACHYCARDIA  CONSIDER RIGHT ATRIAL ABNORMALITY  LVH WITH SECONDARY REPOLARIZATION ABNORMALITY  ANTERIOR ST ELEVATION, PROBABLY DUE TO LVH  ARTIFACT IN LEAD(S) I,II,III,aVR,aVL,aVF,V1 AND BASELINE WANDER IN LEAD(S) V4  No previous ECG available for comparison  Electronically Signed On 3-7-2020 1 :02:09 PST by Yanique Villa MD     EKG (Now)   Result Value Ref Range    Report       [facility] Emergency Dept.    Test Date:  2020  Pt Name:    STEVE NOVAK         Department: [facility]  MRN:        [id number]                      [facility]  Gender:     Male                         Technician: LILIA  :        1965                   Requested By:YANIQUE VILLA  Order #:    [id number]                    Reading MD: Yanique Villa MD    Measurements  Intervals                                Axis  Rate:       115                          P:          66  OR:         148                          QRS:        27  QRSD:       92                           T:          97  QT:         348  QTc:        482    Interpretive Statements  SINUS TACHYCARDIA  LVH WITH SECONDARY REPOLARIZATION ABNORMALITY  BORDERLINE PROLONGED QT INTERVAL  ST depressions and T wave inversions noted in lateral leads  No STEMI  Compared to ECG 2020 22:36:04  No significant change  Electronically Signed On 3-7-2020 1:02:40 PST by Yanique Villa MD           LABS  Personally reviewed by me  Labs Reviewed   CBC WITH DIFFERENTIAL - Abnormal; Notable for the following components:       Result Value    WBC 12.2 (*)     Hemoglobin 18.7 (*)     Hematocrit 53.5 (*)     Neutrophils (Absolute) 8.28 (*)     Baso (Absolute) 0.15 (*)     All other components within normal limits   COMP METABOLIC PANEL - Abnormal; Notable for the following components:    Sodium 133 (*)     Potassium 3.3 (*)     Glucose 174 (*)     AST(SGOT) 65 (*)     All other  components within normal limits   PROBRAIN NATRIURETIC PEPTIDE, NT - Abnormal; Notable for the following components:    NT-proBNP 1718 (*)     All other components within normal limits   TROPONIN   INFLUENZA A/B BY PCR    Narrative:     PUI for possible COVID-19. Reflex to RSPPP if negative.   GROUP A STREP BY PCR    Narrative:     PUI for possible COVID-19. Reflex to [facility]SPPP if negative.   LACTIC ACID   RAPID STREP,CULT IF INDICATED    Narrative:     PUI for possible COVID-19. Reflex to RSPPP if negative.   ESTIMATED GFR   BLOOD CULTURE   BLOOD CULTURE           RADIOLOGY  Personally reviewed by me  DX-CHEST-PORTABLE (1 VIEW)   Final Result      Moderate to severe perihilar and interstitial airspace opacities which could be seen in the setting of edema and/or infection.            ED COURSE  Vitals:    03/06/20 2235 03/06/20 2250 03/06/20 2303 03/06/20 2309   BP:  (!) 248/169 (!) 239/176 (!) 227/141   Pulse: (!) 125 (!) 136 (!) 134 (!) 133   Resp: (!) 30 (!) 33  (!) 30   SpO2: (!) 81% 97% 92% 93%   Weight:       Height:             Medications administered:  Medications   ipratropium-albuterol (DUONEB) nebulizer solution (0 mL Nebulization Held 3/6/20 2300)   nitroglycerin 50 mg in D5W 250 ml infusion (200 mcg/min Intravenous Rate Change 3/6/20 2313)   furosemide (LASIX) injection 80 mg (80 mg Intravenous Given 3/6/20 2313)   nitroglycerin 50 mg in D5W 250 ml infusion (400 mcg/min Intravenous New Bag 3/6/20 2312)   nitroglycerin 50 mg in D5W 250 ml infusion (400 mcg/min Intravenous New Bag 3/6/20 2308)   LORazepam (ATIVAN) injection 1 mg (1 mg Intravenous Given 3/6/20 2327)         MEDICAL DECISION MAKING  Patient with history of untreated hypertension, otherwise healthy who presents with acute onset of shortness of breath and chest tightness 1 hour prior to arrival.  He is afebrile, severely hypertensive and tachycardic on arrival as well as hypoxic on room air.  He is in significant respiratory distress with  audible crackles.  History and exam is often concerning for acute pulmonary edema.    Treatment was immediately initiated with nitroglycerin bolus and drip.  Patient was also placed on BiPAP with slow improvement of his respiratory distress.    EKG does not show signs of ischemia or arrhythmia.  Troponin is negative.  BNP is mildly elevated however chest x-ray shows signs of severe edema.  The patient has not had any recent fevers or coughing therefore doubt infection process.  He has a mild leukocytosis and normal lactate.  The patient has not had any recent travel or exposure to coronavirus therefore airborne and droplet precautions were discontinued.    Following multiple boluses of nitroglycerin and titrating up the drip in addition to BiPAP, the patient's blood pressure is decreasing and he is much more comfortable.  Will admit to the ICU for continued treatment and monitoring and echocardiogram in the morning.    I discussed the plan of care with Dr. Page, hospitalist on-call, accepts admission of the patient.  The patient was updated on plan of care and agreeable at this time.  He is in stable but critical condition at the time of transfer to the ICU.    CRITICAL CARE TIME  Upon my evaluation, this patient had a high probability of imminent or life-threatening deterioration due to acute pulmonary edema, hypertensive emergency requiring noninvasive positive pressure ventilation and nitroglycerin drip which required my direct attention, intervention, and personal management.     I personally provided 45 minutes of total critical care time outside of time spent on separately billable/documented procedures. Time includes: review of laboratory data, review of radiology studies, discussion with consultants, discussion with family/patient, monitoring for potential decompensation.  Interventions were performed as documented above.         IMPRESSION  (J81.0) Acute pulmonary edema (HCC)  (I16.1) Hypertensive  emergency  (J96.01) Acute respiratory failure with hypoxia (HCC)    Disposition: Admit medicine, critical condition  Results, diagnoses, and treatment options were discussed with the patient and/or family. Patient verbalized understanding of plan of care.    Patient referred to primary care provider for monitoring and treatment of blood pressure.      New Prescriptions    No medications on file           Electronically signed by: Yanique Villa M.D., 3/6/2020 11:48 PM

## 2020-03-07 NOTE — ASSESSMENT & PLAN NOTE
-Likely reactive, no additional sign of infection  -This was a sudden onset associated with profoundly uncontrolled hypertension  -I do not feel there is an infection, no antibiotics required at this time

## 2020-03-07 NOTE — PROGRESS NOTES
Received pt from ER around 0445. Assumed pt care. AAOx4. Pt denied pain or SOB at rest. Assessment completed. Oriented pt to unit and updated pt on POC. Reminded pt to call for assistance. Call light within reach, bed in lowest position, bed alarm on, will continue to monitor.

## 2020-03-07 NOTE — FLOWSHEET NOTE
03/07/20 1145   Events/Summary/Plan   Events/Summary/Plan Pt remains on NC. Decreased O2 to 3L   Skin Inspection Respiratory Device Intact   Vital Signs   Pulse 71   Respiration 18   Pulse Oximetry 96 %   $ Pulse Oximetry (Spot Check) Yes   Respiratory Assessment   Level of Consciousness Alert   Breath Sounds   RUL Breath Sounds Clear   RML Breath Sounds Clear   RLL Breath Sounds Clear   RYE Breath Sounds Clear   LLL Breath Sounds Clear   Oxygen   O2 (LPM) 3   O2 Delivery Device Nasal Cannula

## 2020-03-07 NOTE — ED NOTES
Pt given 2-separate bolus doses of 400 mcg (1st dose given @ 2308 & 2nd dose given @ 2312) of Rx-Nitro; ERP bedside; tolerated well;

## 2020-03-07 NOTE — CARE PLAN
Problem: Venous Thromboembolism (VTW)/Deep Vein Thrombosis (DVT) Prevention:  Goal: Patient will participate in Venous Thrombosis (VTE)/Deep Vein Thrombosis (DVT)Prevention Measures  Outcome: PROGRESSING AS EXPECTED  Flowsheets (Taken 3/7/2020 0500)  Pharmacologic Prophylaxis Used: LMWH: Enoxaparin(Lovenox)     Problem: Respiratory:  Goal: Respiratory status will improve  Intervention: Assess and monitor pulmonary status  Note: Pt tolerating Bipap well at 12/5 and 35% FiO2. RR 18. Pt denies any SOB. Pt took his pills while on 6L nasal cannula without any issues. Pt also stood at the edge of the bed x2 to void without any difficulty. RT to attempt trial off Bipap again per Dr. Jay's order.

## 2020-03-07 NOTE — CARE PLAN
Problem: Communication  Goal: The ability to communicate needs accurately and effectively will improve  Outcome: PROGRESSING AS EXPECTED     Problem: Safety  Goal: Will remain free from injury  Outcome: PROGRESSING AS EXPECTED  Goal: Will remain free from falls  Outcome: PROGRESSING AS EXPECTED  Intervention: Assess risk factors for falls  Flowsheets (Taken 3/7/2020 0431 by Stephen Kerns R.N.)  History of fall: 0  Note:  Pt up self to stand edge of bed, steady on feet, encouraged to call for asst for lines with oob/chair.      Problem: Knowledge Deficit  Goal: Knowledge of disease process/condition, treatment plan, diagnostic tests, and medications will improve  Outcome: PROGRESSING AS EXPECTED  Intervention: Assess knowledge level of disease process/condition, treatment plan, diagnostic tests, and medications  Note: Poc to be reviewed with pt daily.  Pt encouraged to call with any questions/concerns.   Goal: Knowledge of the prescribed therapeutic regimen will improve  Outcome: PROGRESSING AS EXPECTED

## 2020-03-07 NOTE — PROGRESS NOTES
[id number] - report from Shweta BUCK.  Pt resting comfortably in bed .  Wife is in bed with pt.  Lounge chair brought in for spouse to use.  Pt A&O x 4.  Up to stabd edge if bed to void steady on feet.  Fall precautions in effect.  Pt calls approp for asst.  Denies pain, sob, dizziness, nausea. See flowsheet for assess.  poc reviewed with pt, pt vu, all questions answered.  Tele = sr,  Vss.    Good po intake with am meal, elizabeth diet well.    Pt transferred to mt rm 312-1 with all belongings.  Report to Marylou BUCK who will assume care. Vss at time of transfer.

## 2020-03-07 NOTE — ASSESSMENT & PLAN NOTE
Had required BiPAP for flash pulm edema due to decompensated HF  I did personally review his chest x-ray, noted bilateral pulmonary edema consistent with flash pulmonary edema  He required ICU admission with nitro gtt  Improving, wean off O2 as able  Continue tight BP control

## 2020-03-07 NOTE — ASSESSMENT & PLAN NOTE
Hx poorly and untreated HTN  Profound elevation in his blood pressure, slowly improving  Off NTG gtt  Continue lisinopril  Add Lasix given EF drop  Imdur added  Will discuss with CM regarding affordable medications

## 2020-03-07 NOTE — PROGRESS NOTES
2 RN Skin Check    2 RN skin check complete.   Devices in place: Nasal Cannula.  Skin assessed under devices: yes.  Confirmed pressure ulcers found on: none.  New potential pressure ulcers noted on none. Wound consult placed N/A.  The following interventions in place Pillows.  Patient has a right shin scab in place.

## 2020-03-07 NOTE — CARE PLAN
Problem: Communication  Goal: The ability to communicate needs accurately and effectively will improve  Outcome: PROGRESSING AS EXPECTED  Patient verbalizes needs     Problem: Safety  Goal: Will remain free from injury  Outcome: PROGRESSING AS EXPECTED  Goal: Will remain free from falls  Outcome: PROGRESSING AS EXPECTED   Patient verbalizes needs

## 2020-03-07 NOTE — ED PROVIDER NOTES
ED Provider Note    Addendum:    0310: Patient became bradycardic with sinus bradycardia versus junctional rhythm with T wave abnormalities.  Repeat blood pressure was performed with a stacia in the 70s systolic.  Nitroglycerin was immediately stopped.  Patient was reporting lightheadedness.  Lung sounds demonstrated crackles and the patient had a hypoxia in the upper 80s.  EKG demonstrated new T wave changes.  After nitroglycerin stopped, patient's blood pressure began to rebound, his bradycardia resolved.  RT was at the bedside to increase the patient's BiPAP settings including pressure and oxygenation with good effect.  Labs sent just prior to this event demonstrates normal potassium.  Patient does have a new positive troponin of 62 increased from 12. This is likely from demand ischemia from hypertensive acute CHF rather than type 1 MI as the patient has no chest pain or other ACS symptoms.  These findings were discussed with Dr. Page, hospitalist.      0335 Initial EKG demonstrates deep T wave inversions concerning for wellens syndrome. After return of normal blood pressure, pt had repeat EKG with improved anterior t wave inversions, but biphasic T waves in lead V3, which could be consistent with Wellens. Given the concerning EKG, cardiology consulted, Dr. Gibson.    4:25 AM  EKG findings discussed with cardiology, Dr. Capps, who does not believe the patient requires transfer for cath. Patient has no chest pain, unlikely ACS in his clinical scenario. They recommend continued monitoring of the patient.          EKG (NOW)   Result Value Ref Range    Report       [facility] Emergency Dept.    Test Date:  2020  Pt Name:    STEVE NOVAK         Department: [facility]  MRN:        [id number]                      Room:       -ROOM 6  Gender:     Male                         Technician: RACHEAL  :        1965                   Requested By:DANICA BILLINGSLEY  Order #:    [id number]                     Reading MD: Rolly Billingsley    Measurements  Intervals                                Axis  Rate:       39                           P:          75  DE:         173                          QRS:        33  QRSD:       150                          T:          140  QT:         557  QTc:        449    Interpretive Statements  Sinus bradycardia  LVH with IVCD and secondary repol abnrm  Baseline wander in lead(s) II,V3  Compared to ECG 2020 00:49:35  Intraventricular conduction delay now present  Sinus tachycardia no longer present  ST (T wave) deviation no longer present  Electronically Signed On 3-7-2020 3:59:54 PST by Rolly billingsley     EKG (Now)   Result Value Ref Range    Report       [facility] Dept.    Test Date:  2020  Pt Name:    STEVE NOVAK         Department: [facility]  MRN:        [id number]                      [facility]  Gender:     Male                         Technician: RACHEAL  :        1965                   Requested By:ROLLY BILLINGSLEY  Order #:    [id number]                    Reading MD: Rolly Billingsley    Measurements  Intervals                                Axis  Rate:       72                           P:          66  DE:         160                          QRS:        17  QRSD:       105                          T:          127  QT:         453  QTc:        496    Interpretive Statements  Sinus rhythm  Probable left atrial enlargement  LVH with secondary repolarization abnormality  Borderline prolonged QT interval  Compared to ECG 2020 03:17:22  Sinus bradycardia no longer present  Intraventricular conduction delay no longer present  Electronically Signed On 3-7-2020 3:59:58 PST by Rolly billingsley     EKG (NOW)   Result Value Ref Range    Report       [facility] Dept[facility]    Test Date:  2020  Pt Name:    STEVE NOVAK         Department: [facility]  MRN:        [id number]                      Room:        -ROOM 6  Gender:     Male                         Technician: RACHEAL  :        1965                   Requested By:DANICA ROBERTS  Order #:    [id number]                    Reading MD: Danica Roberts    Measurements  Intervals                                Axis  Rate:       73                           P:          68  IL:         167                          QRS:        11  QRSD:       108                          T:          114  QT:         439  QTc:        484    Interpretive Statements  Sinus rhythm  Probable left atrial enlargement  LVH with secondary repolarization abnormality  Borderline prolonged QT interval  Compared to ECG 2020 03:27:52  No significant changes  Electronically Signed On 3-7-2020 4:24:29 PST by Danica Roberts           Encounter diagnoses:  1. Acute pulmonary edema (HCC)     2. Hypertensive emergency     3. Acute respiratory failure with hypoxia (HCC)     4.     Elevated troponin   5. Abnormal EKG

## 2020-03-07 NOTE — PROGRESS NOTES
RN receieved patient from [place]. Patient is A&O, ELISE, On 3LNC O2, resting comfortably in bed, pain level on a scale from 1-10 is 0. Call light is within reach, bed is in low position and locked, and the patient is oriented to their surroundings.

## 2020-03-07 NOTE — ED NOTES
Notified by lab that blood cultures need to be redraw because they cannot locate 1st & 2nd set drawn that was drawn @ 2233 (3/6/2020); both sets of blood cultures redrawn again @ 0400 and given to lab; CN & ERP notified;

## 2020-03-07 NOTE — ED TRIAGE NOTES
Pt BIB wife c/o severe SOB and chest pain started about an hour ago  RA sat 81%  Charge RN aware  Pt take right back to room 7A  MD at BS  EKG being done  SL placed by ED tech w/ bld drawn  Audible crackles heard w/ breathing  Speaking in one-two word sentences

## 2020-03-07 NOTE — ASSESSMENT & PLAN NOTE
I feel this is likely due to hypertensive emergency  His systolic blood pressure was in the 250s upon arrival  ECHO EF 40%  Mgmt as above

## 2020-03-08 PROBLEM — I50.23 ACUTE ON CHRONIC SYSTOLIC HEART FAILURE (HCC): Status: ACTIVE | Noted: 2020-03-08

## 2020-03-08 LAB
ANION GAP SERPL CALC-SCNC: 12 MMOL/L (ref 7–16)
BASOPHILS # BLD AUTO: 1 % (ref 0–1.8)
BASOPHILS # BLD: 0.1 K/UL (ref 0–0.12)
BUN SERPL-MCNC: 20 MG/DL (ref 8–22)
CALCIUM SERPL-MCNC: 8.8 MG/DL (ref 8.4–10.2)
CHLORIDE SERPL-SCNC: 102 MMOL/L (ref 96–112)
CHOLEST SERPL-MCNC: 149 MG/DL (ref 100–199)
CO2 SERPL-SCNC: 23 MMOL/L (ref 20–33)
CREAT SERPL-MCNC: 0.85 MG/DL (ref 0.5–1.4)
EOSINOPHIL # BLD AUTO: 0.24 K/UL (ref 0–0.51)
EOSINOPHIL NFR BLD: 2.3 % (ref 0–6.9)
ERYTHROCYTE [DISTWIDTH] IN BLOOD BY AUTOMATED COUNT: 40.3 FL (ref 35.9–50)
GLUCOSE BLD-MCNC: 86 MG/DL (ref 65–99)
GLUCOSE BLD-MCNC: 89 MG/DL (ref 65–99)
GLUCOSE SERPL-MCNC: 98 MG/DL (ref 65–99)
HCT VFR BLD AUTO: 46.4 % (ref 42–52)
HDLC SERPL-MCNC: 45 MG/DL
HGB BLD-MCNC: 16.3 G/DL (ref 14–18)
IMM GRANULOCYTES # BLD AUTO: 0.03 K/UL (ref 0–0.11)
IMM GRANULOCYTES NFR BLD AUTO: 0.3 % (ref 0–0.9)
LDLC SERPL CALC-MCNC: 87 MG/DL
LYMPHOCYTES # BLD AUTO: 2.52 K/UL (ref 1–4.8)
LYMPHOCYTES NFR BLD: 24.3 % (ref 22–41)
MCH RBC QN AUTO: 31.4 PG (ref 27–33)
MCHC RBC AUTO-ENTMCNC: 35.1 G/DL (ref 33.7–35.3)
MCV RBC AUTO: 89.4 FL (ref 81.4–97.8)
MONOCYTES # BLD AUTO: 0.74 K/UL (ref 0–0.85)
MONOCYTES NFR BLD AUTO: 7.1 % (ref 0–13.4)
NEUTROPHILS # BLD AUTO: 6.75 K/UL (ref 1.82–7.42)
NEUTROPHILS NFR BLD: 65 % (ref 44–72)
NRBC # BLD AUTO: 0 K/UL
NRBC BLD-RTO: 0 /100 WBC
NT-PROBNP SERPL IA-MCNC: 1626 PG/ML (ref 0–125)
PLATELET # BLD AUTO: 195 K/UL (ref 164–446)
PMV BLD AUTO: 10.2 FL (ref 9–12.9)
POTASSIUM SERPL-SCNC: 3.9 MMOL/L (ref 3.6–5.5)
RBC # BLD AUTO: 5.19 M/UL (ref 4.7–6.1)
SODIUM SERPL-SCNC: 137 MMOL/L (ref 135–145)
TRIGL SERPL-MCNC: 83 MG/DL (ref 0–149)
WBC # BLD AUTO: 10.4 K/UL (ref 4.8–10.8)

## 2020-03-08 PROCEDURE — A9270 NON-COVERED ITEM OR SERVICE: HCPCS | Performed by: INTERNAL MEDICINE

## 2020-03-08 PROCEDURE — 80048 BASIC METABOLIC PNL TOTAL CA: CPT

## 2020-03-08 PROCEDURE — 700111 HCHG RX REV CODE 636 W/ 250 OVERRIDE (IP): Performed by: HOSPITALIST

## 2020-03-08 PROCEDURE — 700111 HCHG RX REV CODE 636 W/ 250 OVERRIDE (IP): Performed by: INTERNAL MEDICINE

## 2020-03-08 PROCEDURE — 700102 HCHG RX REV CODE 250 W/ 637 OVERRIDE(OP): Performed by: HOSPITALIST

## 2020-03-08 PROCEDURE — A9270 NON-COVERED ITEM OR SERVICE: HCPCS | Performed by: HOSPITALIST

## 2020-03-08 PROCEDURE — 770020 HCHG ROOM/CARE - TELE (206)

## 2020-03-08 PROCEDURE — 85025 COMPLETE CBC W/AUTO DIFF WBC: CPT

## 2020-03-08 PROCEDURE — 80061 LIPID PANEL: CPT

## 2020-03-08 PROCEDURE — 82962 GLUCOSE BLOOD TEST: CPT

## 2020-03-08 PROCEDURE — 99233 SBSQ HOSP IP/OBS HIGH 50: CPT | Performed by: HOSPITALIST

## 2020-03-08 PROCEDURE — 83880 ASSAY OF NATRIURETIC PEPTIDE: CPT

## 2020-03-08 PROCEDURE — 700102 HCHG RX REV CODE 250 W/ 637 OVERRIDE(OP): Performed by: INTERNAL MEDICINE

## 2020-03-08 RX ORDER — LISINOPRIL 5 MG/1
10 TABLET ORAL ONCE
Status: COMPLETED | OUTPATIENT
Start: 2020-03-08 | End: 2020-03-08

## 2020-03-08 RX ORDER — FUROSEMIDE 10 MG/ML
40 INJECTION INTRAMUSCULAR; INTRAVENOUS
Status: DISCONTINUED | OUTPATIENT
Start: 2020-03-08 | End: 2020-03-09 | Stop reason: HOSPADM

## 2020-03-08 RX ORDER — LISINOPRIL 20 MG/1
20 TABLET ORAL
Status: DISCONTINUED | OUTPATIENT
Start: 2020-03-09 | End: 2020-03-09

## 2020-03-08 RX ADMIN — ENOXAPARIN SODIUM 40 MG: 40 INJECTION SUBCUTANEOUS at 05:38

## 2020-03-08 RX ADMIN — POTASSIUM CHLORIDE 40 MEQ: 1500 TABLET, EXTENDED RELEASE ORAL at 05:38

## 2020-03-08 RX ADMIN — FUROSEMIDE 40 MG: 10 INJECTION, SOLUTION INTRAVENOUS at 15:32

## 2020-03-08 RX ADMIN — NITROGLYCERIN 1 INCH: 20 OINTMENT TOPICAL at 17:44

## 2020-03-08 RX ADMIN — NITROGLYCERIN 1 INCH: 20 OINTMENT TOPICAL at 13:14

## 2020-03-08 RX ADMIN — ENALAPRILAT 1.25 MG: 2.5 INJECTION INTRAVENOUS at 19:29

## 2020-03-08 RX ADMIN — LISINOPRIL 10 MG: 5 TABLET ORAL at 11:32

## 2020-03-08 RX ADMIN — LISINOPRIL 10 MG: 5 TABLET ORAL at 05:38

## 2020-03-08 RX ADMIN — FUROSEMIDE 20 MG: 10 INJECTION, SOLUTION INTRAMUSCULAR; INTRAVENOUS at 05:38

## 2020-03-08 RX ADMIN — POTASSIUM CHLORIDE 40 MEQ: 1500 TABLET, EXTENDED RELEASE ORAL at 17:43

## 2020-03-08 RX ADMIN — SENNOSIDES AND DOCUSATE SODIUM 2 TABLET: 8.6; 5 TABLET ORAL at 05:38

## 2020-03-08 ASSESSMENT — ENCOUNTER SYMPTOMS
PALPITATIONS: 0
NAUSEA: 0
CHILLS: 0
SHORTNESS OF BREATH: 0
TINGLING: 0
VOMITING: 0
TREMORS: 0
FEVER: 0
HEARTBURN: 0
DOUBLE VISION: 0
SENSORY CHANGE: 0
COUGH: 0
BLURRED VISION: 0

## 2020-03-08 NOTE — ASSESSMENT & PLAN NOTE
EF 40%  Due to unchecked HTN for years  Tight BP control  Adding Lasix  Will need outpatient cardiology follow up

## 2020-03-08 NOTE — PROGRESS NOTES
"Hospital Medicine Daily Progress Note    Date of Service  3/8/2020    Chief Complaint  54 y.o. male admitted 3/6/2020 with hypertensive emergency    Hospital Course    53 y/o male with untreated HTN presenting with HTN emergency, SBP>>220, admitted to ICU initially, transfer to floor 3/8 after NTP gtt stopped.           Interval Problem Update  Seen and examined. Chart reviewed, including labs and any pertinent imaging.  Has had HTN for 'long time' but stopped taking medications years ago, sounds like financial stress  Previously was controlled on Lisinopril  No CP at this time, feels improved    Reviewed ECHO, EF 40%  Increased lasix to BID     BP (!) 170/107   Pulse 69   Temp 36.6 °C (97.8 °F) (Oral)   Resp 18   Ht 1.829 m (6' 0.01\")   Wt 74.4 kg (164 lb 0.4 oz)   SpO2 98%     Consultants/Specialty  None    Code Status  Full    Disposition  Inpatient    Review of Systems  Review of Systems   Constitutional: Negative for chills and fever.   Eyes: Negative for blurred vision and double vision.   Respiratory: Negative for cough and shortness of breath.    Cardiovascular: Negative for chest pain and palpitations.   Gastrointestinal: Negative for heartburn, nausea and vomiting.   Skin: Negative for itching and rash.   Neurological: Negative for tingling, tremors and sensory change.   All other systems reviewed and are negative.       Physical Exam  Temp:  [36.6 °C (97.8 °F)-37.1 °C (98.8 °F)] 36.6 °C (97.8 °F)  Pulse:  [65-82] 69  Resp:  [17-18] 18  BP: (155-174)/() 170/107  SpO2:  [91 %-98 %] 98 %    Physical Exam  Vitals signs and nursing note reviewed.   Constitutional:       General: He is not in acute distress.     Appearance: He is not diaphoretic.   HENT:      Head: Normocephalic and atraumatic.      Nose: No congestion.      Mouth/Throat:      Mouth: Mucous membranes are moist.      Pharynx: No oropharyngeal exudate.   Eyes:      General:         Right eye: No discharge.         Left eye: No " discharge.      Extraocular Movements: Extraocular movements intact.      Conjunctiva/sclera: Conjunctivae normal.   Neck:      Musculoskeletal: Normal range of motion. No neck rigidity.   Cardiovascular:      Rate and Rhythm: Normal rate and regular rhythm.      Pulses: Normal pulses.      Heart sounds: No murmur.   Pulmonary:      Effort: Pulmonary effort is normal. No respiratory distress.      Breath sounds: No wheezing.   Abdominal:      General: Abdomen is flat. There is no distension.      Palpations: Abdomen is soft. There is no mass.      Tenderness: There is no abdominal tenderness. There is no guarding.   Musculoskeletal: Normal range of motion.         General: No swelling, tenderness or deformity.   Skin:     General: Skin is warm and dry.      Capillary Refill: Capillary refill takes less than 2 seconds.      Coloration: Skin is not jaundiced.      Findings: No bruising.   Neurological:      General: No focal deficit present.      Mental Status: He is alert and oriented to person, place, and time.      Cranial Nerves: No cranial nerve deficit.      Motor: No weakness.   Psychiatric:         Mood and Affect: Mood normal.         Thought Content: Thought content normal.         Judgment: Judgment normal.         Fluids    Intake/Output Summary (Last 24 hours) at 3/8/2020 1242  Last data filed at 3/8/2020 0903  Gross per 24 hour   Intake 320 ml   Output --   Net 320 ml       Laboratory  Recent Labs     03/06/20 2236 03/07/20 0245 03/08/20  0321   WBC 12.2* 16.3* 10.4   RBC 5.91 5.30 5.19   HEMOGLOBIN 18.7* 16.6 16.3   HEMATOCRIT 53.5* 47.3 46.4   MCV 90.5 89.2 89.4   MCH 31.6 31.3 31.4   MCHC 35.0 35.1 35.1   RDW 40.1 39.9 40.3   PLATELETCT 222 203 195   MPV 9.7 9.9 10.2     Recent Labs     03/06/20 2236 03/07/20 0245 03/08/20  0321   SODIUM 133* 134* 137   POTASSIUM 3.3* 3.7 3.9   CHLORIDE 97 97 102   CO2 24 26 23   GLUCOSE 174* 140* 98   BUN 14 14 20   CREATININE 0.85 0.90 0.85   CALCIUM 8.9 8.5 8.8              Recent Labs     03/08/20  0321   TRIGLYCERIDE 83   HDL 45   LDL 87       Imaging  EC-ECHOCARDIOGRAM COMPLETE W/ CONT   Final Result      DX-CHEST-PORTABLE (1 VIEW)   Final Result      No evidence of acute cardiopulmonary process.      DX-CHEST-PORTABLE (1 VIEW)   Final Result      Moderate to severe perihilar and interstitial airspace opacities which could be seen in the setting of edema and/or infection.           Assessment/Plan  * Acute respiratory failure with hypoxia (HCC)- (present on admission)  Assessment & Plan  Had required BiPAP for flash pulm edema due to decompensated HF  I did personally review his chest x-ray, noted bilateral pulmonary edema consistent with flash pulmonary edema  He required ICU admission with nitro gtt  Improving, wean off O2 as able  Continue tight BP control    Flash pulmonary edema (HCC)- (present on admission)  Assessment & Plan  I feel this is likely due to hypertensive emergency  His systolic blood pressure was in the 250s upon arrival  ECHO EF 40%  Mgmt as above    Hypertensive emergency- (present on admission)  Assessment & Plan  Hx poorly and untreated HTN  Profound elevation in his blood pressure, slowly improving  Off NTG gtt  Continue lisinopril  Add Lasix given EF drop  Imdur added  Will discuss with CM regarding affordable medications    Acute on chronic systolic heart failure (HCC)  Assessment & Plan  EF 40%  Due to unchecked HTN for years  Tight BP control  Adding Lasix  Will need outpatient cardiology follow up    Hypokalemia- (present on admission)  Assessment & Plan  -Patient will be placed on oral potassium supplementation  -Repeat BMP in the morning    Hyperglycemia- (present on admission)  Assessment & Plan  -Mild, no history of diabetes however he does not follow-up with PCP  -Obtain hemoglobin A1c  -Start insulin sliding scale    Leukocytosis- (present on admission)  Assessment & Plan  -Likely reactive, no additional sign of infection  -This was a  sudden onset associated with profoundly uncontrolled hypertension  -I do not feel there is an infection, no antibiotics required at this time    Polycythemia- (present on admission)  Assessment & Plan  -Likely due to hypoxia  -Repeat CBC in the morning     VTE prophylaxis: heparin

## 2020-03-08 NOTE — CARE PLAN
Problem: Respiratory:  Goal: Respiratory status will improve  Outcome: PROGRESSING AS EXPECTED  Note: Monitor lung sounds and respiratory status, provide supplemental oxygen as needed, perform walking O2 eval.      Problem: Fluid Volume:  Goal: Will maintain balanced intake and output  Outcome: PROGRESSING AS EXPECTED  Note: Monitor I&O's, administer diuretics as ordered, monitor labs and vital signs.

## 2020-03-08 NOTE — PROGRESS NOTES
O2 sat 98% with 2 L NC, patient has been ambulating to bathroom without O2 wihtout difficulty. O2 sat 91-95% ambulating in hallway on RA, patient denies SOB; O2 removed. Patient requesting to shower, scheduled nitro-bid applied after shower.

## 2020-03-08 NOTE — CARE PLAN
Problem: Communication  Goal: The ability to communicate needs accurately and effectively will improve  Outcome: PROGRESSING AS EXPECTED  Note: Patient updated on the plan of care. Encouraged to voice feelings and to ask questions. Answered all questions and concerns. Agrees with the plan of care.      Problem: Safety  Goal: Will remain free from injury  Outcome: PROGRESSING AS EXPECTED  Note: Safety precautions in place. Will continue to monitor patient.   Goal: Will remain free from falls  Outcome: PROGRESSING AS EXPECTED  Note: Safety precautions in place. Will continue to monitor patient.      Problem: Infection  Goal: Will remain free from infection  Outcome: PROGRESSING AS EXPECTED  Note: Discussed the importance of infection prevention and hand hygiene.

## 2020-03-08 NOTE — PROGRESS NOTES
Received bedside patient report from HEBER Hickman. Patient resting comfortably in bed, no complaints at this time. Safety precautions in place. Family at bedside. Will continue to monitor.

## 2020-03-08 NOTE — PROGRESS NOTES
Telemetry Shift Summary    Rhythm SR  HR Range 63-99  Ectopy Rare PVC  Measurements 0.18/0.08/0.44        Normal Values  Rhythm SR  HR Range    Measurements 0.12-0.20 / 0.06-0.10  / 0.30-0.52

## 2020-03-08 NOTE — PROGRESS NOTES
Report received from Hayley BUCK, care assumed. Patient sitting up in bed, no s/s of distress. Denies needs at this time. Safety precautions in place.

## 2020-03-09 ENCOUNTER — PATIENT OUTREACH (OUTPATIENT)
Dept: HEALTH INFORMATION MANAGEMENT | Facility: OTHER | Age: 55
End: 2020-03-09

## 2020-03-09 VITALS
HEIGHT: 72 IN | SYSTOLIC BLOOD PRESSURE: 144 MMHG | RESPIRATION RATE: 18 BRPM | TEMPERATURE: 98.5 F | BODY MASS INDEX: 22.22 KG/M2 | HEART RATE: 67 BPM | OXYGEN SATURATION: 94 % | WEIGHT: 164.02 LBS | DIASTOLIC BLOOD PRESSURE: 76 MMHG

## 2020-03-09 LAB
ANION GAP SERPL CALC-SCNC: 10 MMOL/L (ref 7–16)
BASOPHILS # BLD AUTO: 1.2 % (ref 0–1.8)
BASOPHILS # BLD: 0.12 K/UL (ref 0–0.12)
BUN SERPL-MCNC: 23 MG/DL (ref 8–22)
CALCIUM SERPL-MCNC: 9.3 MG/DL (ref 8.4–10.2)
CHLORIDE SERPL-SCNC: 102 MMOL/L (ref 96–112)
CO2 SERPL-SCNC: 24 MMOL/L (ref 20–33)
CREAT SERPL-MCNC: 0.93 MG/DL (ref 0.5–1.4)
EOSINOPHIL # BLD AUTO: 0.29 K/UL (ref 0–0.51)
EOSINOPHIL NFR BLD: 3 % (ref 0–6.9)
ERYTHROCYTE [DISTWIDTH] IN BLOOD BY AUTOMATED COUNT: 40.5 FL (ref 35.9–50)
GLUCOSE SERPL-MCNC: 94 MG/DL (ref 65–99)
HCT VFR BLD AUTO: 48.2 % (ref 42–52)
HGB BLD-MCNC: 16.7 G/DL (ref 14–18)
IMM GRANULOCYTES # BLD AUTO: 0.03 K/UL (ref 0–0.11)
IMM GRANULOCYTES NFR BLD AUTO: 0.3 % (ref 0–0.9)
LYMPHOCYTES # BLD AUTO: 2.29 K/UL (ref 1–4.8)
LYMPHOCYTES NFR BLD: 23.8 % (ref 22–41)
MCH RBC QN AUTO: 31.5 PG (ref 27–33)
MCHC RBC AUTO-ENTMCNC: 34.6 G/DL (ref 33.7–35.3)
MCV RBC AUTO: 90.8 FL (ref 81.4–97.8)
MONOCYTES # BLD AUTO: 0.77 K/UL (ref 0–0.85)
MONOCYTES NFR BLD AUTO: 8 % (ref 0–13.4)
NEUTROPHILS # BLD AUTO: 6.14 K/UL (ref 1.82–7.42)
NEUTROPHILS NFR BLD: 63.7 % (ref 44–72)
NRBC # BLD AUTO: 0 K/UL
NRBC BLD-RTO: 0 /100 WBC
PLATELET # BLD AUTO: 197 K/UL (ref 164–446)
PMV BLD AUTO: 10.2 FL (ref 9–12.9)
POTASSIUM SERPL-SCNC: 4.2 MMOL/L (ref 3.6–5.5)
RBC # BLD AUTO: 5.31 M/UL (ref 4.7–6.1)
SODIUM SERPL-SCNC: 136 MMOL/L (ref 135–145)
WBC # BLD AUTO: 9.6 K/UL (ref 4.8–10.8)

## 2020-03-09 PROCEDURE — A9270 NON-COVERED ITEM OR SERVICE: HCPCS | Performed by: HOSPITALIST

## 2020-03-09 PROCEDURE — 700111 HCHG RX REV CODE 636 W/ 250 OVERRIDE (IP): Performed by: INTERNAL MEDICINE

## 2020-03-09 PROCEDURE — 85025 COMPLETE CBC W/AUTO DIFF WBC: CPT

## 2020-03-09 PROCEDURE — 700101 HCHG RX REV CODE 250: Performed by: INTERNAL MEDICINE

## 2020-03-09 PROCEDURE — 80048 BASIC METABOLIC PNL TOTAL CA: CPT

## 2020-03-09 PROCEDURE — 700111 HCHG RX REV CODE 636 W/ 250 OVERRIDE (IP): Performed by: HOSPITALIST

## 2020-03-09 PROCEDURE — A9270 NON-COVERED ITEM OR SERVICE: HCPCS | Performed by: INTERNAL MEDICINE

## 2020-03-09 PROCEDURE — 700102 HCHG RX REV CODE 250 W/ 637 OVERRIDE(OP): Performed by: HOSPITALIST

## 2020-03-09 PROCEDURE — 99239 HOSP IP/OBS DSCHRG MGMT >30: CPT | Performed by: HOSPITALIST

## 2020-03-09 PROCEDURE — 700102 HCHG RX REV CODE 250 W/ 637 OVERRIDE(OP): Performed by: INTERNAL MEDICINE

## 2020-03-09 RX ORDER — ISOSORBIDE MONONITRATE 30 MG/1
60 TABLET, EXTENDED RELEASE ORAL
Status: DISCONTINUED | OUTPATIENT
Start: 2020-03-09 | End: 2020-03-09 | Stop reason: HOSPADM

## 2020-03-09 RX ORDER — LISINOPRIL 40 MG/1
40 TABLET ORAL DAILY
Qty: 30 TAB | Refills: 1 | Status: SHIPPED | OUTPATIENT
Start: 2020-03-10

## 2020-03-09 RX ORDER — ISOSORBIDE MONONITRATE 60 MG/1
60 TABLET, EXTENDED RELEASE ORAL DAILY
Qty: 30 TAB | Refills: 1 | Status: SHIPPED | OUTPATIENT
Start: 2020-03-10

## 2020-03-09 RX ORDER — LISINOPRIL 20 MG/1
20 TABLET ORAL ONCE
Status: COMPLETED | OUTPATIENT
Start: 2020-03-09 | End: 2020-03-09

## 2020-03-09 RX ORDER — LISINOPRIL 20 MG/1
40 TABLET ORAL
Status: DISCONTINUED | OUTPATIENT
Start: 2020-03-10 | End: 2020-03-09 | Stop reason: HOSPADM

## 2020-03-09 RX ADMIN — SENNOSIDES AND DOCUSATE SODIUM 2 TABLET: 8.6; 5 TABLET ORAL at 05:26

## 2020-03-09 RX ADMIN — NITROGLYCERIN 1 INCH: 20 OINTMENT TOPICAL at 05:26

## 2020-03-09 RX ADMIN — LISINOPRIL 20 MG: 20 TABLET ORAL at 05:26

## 2020-03-09 RX ADMIN — ISOSORBIDE MONONITRATE 60 MG: 30 TABLET, EXTENDED RELEASE ORAL at 09:06

## 2020-03-09 RX ADMIN — LABETALOL HYDROCHLORIDE 10 MG: 5 INJECTION, SOLUTION INTRAVENOUS at 00:47

## 2020-03-09 RX ADMIN — LISINOPRIL 20 MG: 20 TABLET ORAL at 09:05

## 2020-03-09 RX ADMIN — ACETAMINOPHEN 650 MG: 325 TABLET, FILM COATED ORAL at 02:08

## 2020-03-09 RX ADMIN — FUROSEMIDE 40 MG: 10 INJECTION, SOLUTION INTRAVENOUS at 05:25

## 2020-03-09 RX ADMIN — POTASSIUM CHLORIDE 40 MEQ: 1500 TABLET, EXTENDED RELEASE ORAL at 05:26

## 2020-03-09 RX ADMIN — ENALAPRILAT 1.25 MG: 2.5 INJECTION INTRAVENOUS at 02:06

## 2020-03-09 RX ADMIN — ENOXAPARIN SODIUM 40 MG: 40 INJECTION SUBCUTANEOUS at 05:26

## 2020-03-09 NOTE — PROGRESS NOTES
Assumed care of patient.  Received report from Hayley BUCK.  Verified lines.  Call light and belongings within reach.  Bed in low and locked position.  Patient resting in bed; no needs at this time.

## 2020-03-09 NOTE — PROGRESS NOTES
Cardiac monitor summary:    Rhythm: sinus rhythm  Rate: 64-84  Ectopy: rPVC's  Measurements: .16/.08/.40

## 2020-03-09 NOTE — PROGRESS NOTES
Patient ambulating up and down the [place]encia. Steady. No assistive devices needed. No assistance needed.

## 2020-03-09 NOTE — PROGRESS NOTES
Telemetry Shift Summary     Rhythm SR  HR Range 63 - 80  Ectopy R PVCs  Measurements 0.16 / 0.08 / 0.42           Normal Values  Rhythm SR  HR Range    Measurements 0.12-0.20 / 0.06-0.10  / 0.30-0.52

## 2020-03-09 NOTE — PROGRESS NOTES
Received bedside patient report from HEBER Carvajal. Patient resting comfortably in bed, no complaints at this time. Safety precautions in place. Will continue to monitor.

## 2020-03-09 NOTE — DISCHARGE INSTRUCTIONS
Discharge Instructions    Discharged to home by car with relative. Discharged via walking, hospital escort: Refused.  Special equipment needed: Not Applicable    Be sure to schedule a follow-up appointment with your primary care doctor or any specialists as instructed.     Discharge Plan:   Influenza Vaccine Indication: Patient Refuses    I understand that a diet low in cholesterol, fat, and sodium is recommended for good health. Unless I have been given specific instructions below for another diet, I accept this instruction as my diet prescription.   Other diet: Heart healthy    Special Instructions: None    · Is patient discharged on Warfarin / Coumadin?   No     Depression / Suicide Risk    As you are discharged from this [facility] facility, it is important to learn how to keep safe from harming yourself.    Recognize the warning signs:  · Abrupt changes in personality, positive or negative- including increase in energy   · Giving away possessions  · Change in eating patterns- significant weight changes-  positive or negative  · Change in sleeping patterns- unable to sleep or sleeping all the time   · Unwillingness or inability to communicate  · Depression  · Unusual sadness, discouragement and loneliness  · Talk of wanting to die  · Neglect of personal appearance   · Rebelliousness- reckless behavior  · Withdrawal from people/activities they love  · Confusion- inability to concentrate     If you or a loved one observes any of these behaviors or has concerns about self-harm, here's what you can do:  · Talk about it- your feelings and reasons for harming yourself  · Remove any means that you might use to hurt yourself (examples: pills, rope, extension cords, firearm)  · Get professional help from the community (Mental Health, Substance Abuse, psychological counseling)  · Do not be alone:Call your Safe Contact- someone whom you trust who will be there for you.  · Call your local CRISIS HOTLINE 326-0457 or  871.580.7655  · Call your local Children's Mobile Crisis Response Team [place] (526) 501-7496 or www.[employer]  · Call the toll free National Suicide Prevention Hotlines   · National Suicide Prevention Lifeline 741-865-QZRF (1447)  · National [employer] Signature Line Network 800-SUICIDE (797-7600)      Discharge Instructions per Fritz Rodriguez M.D.    You were admitted to the hospital with critically high blood pressure.  With a combination medications this has improved.  Please continue lisinopril and Imdur, these both should be affordable medications.  Your longstanding hypertension also has had some effect on your heart, please obtain a primary care physician to discuss further.

## 2020-03-09 NOTE — PROGRESS NOTES
/119 prior to scheduled nitro-bid, rechecked after nitro-bid administration 173/96. Patient asymptomatic.

## 2020-03-09 NOTE — DISCHARGE SUMMARY
Discharge Summary    CHIEF COMPLAINT ON ADMISSION  Chief Complaint   Patient presents with   • Shortness of Breath     started about an hour ago    • Chest Pressure       Reason for Admission  Cough;Blood Pressure Problems     Admission Date  3/6/2020    CODE STATUS  Full Code    HPI & HOSPITAL COURSE  55 y/o male with untreated HTN presenting with HTN emergency, SBP>>220, admitted to ICU initially, transfer to floor 3/8 after NTP gtt stopped.  Patient reported that he had previously been on lisinopril but he has stopped for some reason,.financial concerns given low cost of lisinopril.  Regardless, patient restarted on lisinopril and long-acting nitrates, blood pressure improved.  He will be discharged on Imdur and lisinopril.  Also had evidence of chronic systolic heart failure with EF of 40%, thought to be due to unchecked hypertension.  No evidence of volume overload on exam.  He has been encouraged to establish with new primary care physician in the community for further discussion.      Therefore, he is discharged in fair and stable condition to home with close outpatient follow-up.    The patient met 2-midnight criteria for an inpatient stay at the time of discharge.    Discharge Date  3/9/2020    FOLLOW UP ITEMS POST DISCHARGE  Continue lisinopril and Imdur  Establish with PCP    DISCHARGE DIAGNOSES  Principal Problem:    Acute respiratory failure with hypoxia (HCC) POA: Yes  Active Problems:    Hypertensive emergency POA: Yes    Flash pulmonary edema (HCC) POA: Yes    Polycythemia POA: Yes    Leukocytosis POA: Yes    Hyperglycemia POA: Yes    Hypokalemia POA: Yes    Acute on chronic systolic heart failure (HCC) POA: Unknown  Resolved Problems:    * No resolved hospital problems. *      FOLLOW UP  No future appointments.  No follow-up provider specified.    MEDICATIONS ON DISCHARGE     Medication List      START taking these medications      Instructions   isosorbide mononitrate SR 60 MG Tb24  Start taking  on:  March 10, 2020  Commonly known as:  IMDUR   Take 1 Tab by mouth every day.  Dose:  60 mg     lisinopril 40 MG tablet  Start taking on:  March 10, 2020  Commonly known as:  PRINIVIL   Take 1 Tab by mouth every day.  Dose:  40 mg            Allergies  No Known Allergies    DIET  Orders Placed This Encounter   Procedures   • Diet Order 2 Gram Sodium, Cardiac, Diabetic     Standing Status:   Standing     Number of Occurrences:   1     Order Specific Question:   Diet:     Answer:   2 Gram Sodium [7]     Order Specific Question:   Diet:     Answer:   Cardiac [6]     Order Specific Question:   Diet:     Answer:   Diabetic [3]       ACTIVITY  As tolerated.  Weight bearing as tolerated    CONSULTATIONS  None    PROCEDURES  None    LABORATORY  Lab Results   Component Value Date    SODIUM 136 03/09/2020    POTASSIUM 4.2 03/09/2020    CHLORIDE 102 03/09/2020    CO2 24 03/09/2020    GLUCOSE 94 03/09/2020    BUN 23 (H) 03/09/2020    CREATININE 0.93 03/09/2020        Lab Results   Component Value Date    WBC 9.6 03/09/2020    HEMOGLOBIN 16.7 03/09/2020    HEMATOCRIT 48.2 03/09/2020    PLATELETCT 197 03/09/2020        Total time of the discharge process exceeds 33 minutes.

## 2020-03-09 NOTE — CARE PLAN
Problem: Communication  Goal: The ability to communicate needs accurately and effectively will improve  Outcome: PROGRESSING AS EXPECTED   Patient able to effectively communicate his needs.  Problem: Safety  Goal: Will remain free from injury  Outcome: PROGRESSING AS EXPECTED   Patient educated on use of call light and demonstrates evidence of learning.

## 2020-03-09 NOTE — CARE PLAN
Problem: Communication  Goal: The ability to communicate needs accurately and effectively will improve  Outcome: PROGRESSING AS EXPECTED  Note: Patient and spouse at bedside updated on the plan of care. Encouraged to voice feelings and to ask questions. Answered all questions and concerns. Agrees with the plan of care.     Problem: Safety  Goal: Will remain free from injury  Outcome: PROGRESSING AS EXPECTED  Note: Safety precautions in place. Will continue to monitor patient.   Goal: Will remain free from falls  Outcome: PROGRESSING AS EXPECTED  Note: Safety precautions in place. Will continue to monitor patient.     Problem: Infection  Goal: Will remain free from infection  Outcome: PROGRESSING AS EXPECTED  Note: Discussed the importance of infection prevention and hand hygiene.

## 2020-03-09 NOTE — PROGRESS NOTES
Patient rounded. Vital signs taken and docutmented. PRN medication administered for elevated blood pressure. Denies any pain. Denies any SOB. Comfortable in bed. Appropriate safety precautions in place. Will continue to monitor patient.

## 2020-03-10 NOTE — PROGRESS NOTES
Telemetry Shift Summary    Rhythm:  SR  HR Range: 60-74  Ectopy: o PVC, r Big, rPVC  Measurements: .16/.10/.46          Normal Values  Rhythm SR  HR Range   Measurements 0.12 / 0.20 / 0.06-0.10 / 0.30-0.52

## 2020-03-12 LAB
BACTERIA BLD CULT: NORMAL
BACTERIA BLD CULT: NORMAL
SIGNIFICANT IND 70042: NORMAL
SIGNIFICANT IND 70042: NORMAL
SITE SITE: NORMAL
SITE SITE: NORMAL
SOURCE SOURCE: NORMAL
SOURCE SOURCE: NORMAL

## 2021-03-15 DIAGNOSIS — Z23 NEED FOR VACCINATION: ICD-10-CM

## 2023-01-19 NOTE — PROGRESS NOTES
Patient rounded. Patient sleeping. Comfortable in bed. Appropriate safety precautions in place. Will continue to monitor patient.   n/a

## 2024-07-10 ENCOUNTER — OFFICE VISIT (OUTPATIENT)
Dept: URBAN - METROPOLITAN AREA CLINIC 30 | Facility: CLINIC | Age: 59
End: 2024-07-10
Payer: COMMERCIAL

## 2024-07-10 DIAGNOSIS — E11.9 TYPE 2 DIABETES MELLITUS WITHOUT COMPLICATIONS: Primary | ICD-10-CM

## 2024-07-10 DIAGNOSIS — H43.813 VITREOUS DEGENERATION, BILATERAL: ICD-10-CM

## 2024-07-10 PROCEDURE — 92134 CPTRZ OPH DX IMG PST SGM RTA: CPT

## 2024-07-10 PROCEDURE — 92004 COMPRE OPH EXAM NEW PT 1/>: CPT

## 2024-07-10 ASSESSMENT — INTRAOCULAR PRESSURE
OD: 17
OS: 17

## 2024-07-10 ASSESSMENT — KERATOMETRY
OS: 44.53
OD: 44.52